# Patient Record
Sex: MALE | Race: WHITE | NOT HISPANIC OR LATINO | Employment: UNEMPLOYED | URBAN - METROPOLITAN AREA
[De-identification: names, ages, dates, MRNs, and addresses within clinical notes are randomized per-mention and may not be internally consistent; named-entity substitution may affect disease eponyms.]

---

## 2023-05-30 ENCOUNTER — HOSPITAL ENCOUNTER (EMERGENCY)
Facility: CLINIC | Age: 28
Discharge: HOME | End: 2023-05-30
Attending: EMERGENCY MEDICINE | Admitting: EMERGENCY MEDICINE

## 2023-05-30 VITALS
RESPIRATION RATE: 18 BRPM | SYSTOLIC BLOOD PRESSURE: 119 MMHG | WEIGHT: 140 LBS | BODY MASS INDEX: 21.22 KG/M2 | HEIGHT: 68 IN | HEART RATE: 54 BPM | TEMPERATURE: 98 F | OXYGEN SATURATION: 97 % | DIASTOLIC BLOOD PRESSURE: 78 MMHG

## 2023-05-30 DIAGNOSIS — S61.511A LACERATION OF RIGHT WRIST, INITIAL ENCOUNTER: ICD-10-CM

## 2023-05-30 PROCEDURE — 96372 THER/PROPH/DIAG INJ SC/IM: CPT | Performed by: EMERGENCY MEDICINE

## 2023-05-30 PROCEDURE — 12002 RPR S/N/AX/GEN/TRNK2.6-7.5CM: CPT

## 2023-05-30 PROCEDURE — 99284 EMERGENCY DEPT VISIT MOD MDM: CPT | Mod: 25 | Performed by: EMERGENCY MEDICINE

## 2023-05-30 PROCEDURE — 99284 EMERGENCY DEPT VISIT MOD MDM: CPT

## 2023-05-30 PROCEDURE — 12002 RPR S/N/AX/GEN/TRNK2.6-7.5CM: CPT | Performed by: EMERGENCY MEDICINE

## 2023-05-30 PROCEDURE — 250N000011 HC RX IP 250 OP 636: Performed by: EMERGENCY MEDICINE

## 2023-05-30 RX ORDER — ONDANSETRON 4 MG/1
4 TABLET, ORALLY DISINTEGRATING ORAL ONCE
Status: COMPLETED | OUTPATIENT
Start: 2023-05-30 | End: 2023-05-30

## 2023-05-30 RX ORDER — LORAZEPAM 2 MG/ML
1 INJECTION INTRAMUSCULAR ONCE
Status: COMPLETED | OUTPATIENT
Start: 2023-05-30 | End: 2023-05-30

## 2023-05-30 RX ADMIN — ONDANSETRON 4 MG: 4 TABLET, ORALLY DISINTEGRATING ORAL at 21:19

## 2023-05-30 RX ADMIN — HYDROMORPHONE HYDROCHLORIDE 1 MG: 1 INJECTION, SOLUTION INTRAMUSCULAR; INTRAVENOUS; SUBCUTANEOUS at 20:16

## 2023-05-30 RX ADMIN — LORAZEPAM 1 MG: 2 INJECTION INTRAMUSCULAR; INTRAVENOUS at 20:16

## 2023-05-30 ASSESSMENT — ACTIVITIES OF DAILY LIVING (ADL): ADLS_ACUITY_SCORE: 35

## 2023-05-30 NOTE — ED TRIAGE NOTES
Pt presents with laceration to right wrist. Pt injured it knocking on a door then knocking on the glass. This happened at least 6 hours ago. Waited at [facility] ER and where they applied pressure dressing.      Triage Assessment     Row Name 05/30/23 1861       Triage Assessment (Adult)    Airway WDL WDL       Respiratory WDL    Respiratory WDL WDL       Cardiac WDL    Cardiac WDL WDL

## 2023-05-31 NOTE — ED NOTES
Pt diaphoretic and reports feeling nauseous. MD to be updated. Pain unchanged after med administration

## 2023-05-31 NOTE — ED PROVIDER NOTES
History     Chief Complaint   Patient presents with     Arm Laceration     HPI  Kel Hale is a 27 year old male who presents to the er secondary to a cut on his right wrist which occurred about 5 hours pta when he was knocking on his door and no one was responding so he knocked harder until the glass broke and he sustained a laceration to the right volar wrist.  He went to [place] emergency department and they told him that it would require stitches and they wrapped it up.  They told him there was a 5-hour wait.  Because of that he decided to come down here.    Allergies:  Allergies   Allergen Reactions     No Known Allergies        Problem List:    Patient Active Problem List    Diagnosis Date Noted     Major depressive disorder, single episode, severe (H) 04/16/2015     Priority: Medium     Problem list name updated by automated process. Provider to review       Generalized anxiety disorder 04/16/2015     Priority: Medium     Diagnosis updated by automated process. Provider to review and confirm.       PTSD (post-traumatic stress disorder) 04/16/2015     Priority: Medium     ODD (oppositional defiant disorder) 04/16/2015     Priority: Medium     Insomnia 04/16/2015     Priority: Medium     ADHD (attention deficit hyperactivity disorder) 03/30/2010     Priority: Medium        Past Medical History:    Past Medical History:   Diagnosis Date     ADHD (attention deficit hyperactivity disorder)      Exertional headache, primary        Past Surgical History:    Past Surgical History:   Procedure Laterality Date     ENT SURGERY  6 months old    PE Tubes       Family History:    Family History   Problem Relation Age of Onset     Neurologic Disorder Mother         Mother with classic migraines.     Depression Mother      Anxiety Disorder Mother      Depression Brother        Social History:  Marital Status:  Single [1]  Social History     Tobacco Use     Smoking status: Every Day     Tobacco comments:     1/2 ppd  "  Substance Use Topics     Alcohol use: No     Drug use: No        Medications:    FLUOXETINE HCL PO  GUANFACINE HCL PO  TRAZODONE HCL PO          Review of Systems   All other systems reviewed and are negative.      Physical Exam   BP: (!) 130/97  Pulse: 104  Temp: 98  F (36.7  C)  Resp: 18  Height: 172.7 cm (5' 8\")  Weight: 63.5 kg (140 lb)  SpO2: 98 %      Physical Exam  Vitals and nursing note reviewed.   Constitutional:       General: He is in acute distress.      Appearance: Normal appearance. He is well-developed. He is not diaphoretic.      Comments: Crying, sweating, hyperventilating, shaking, anxious holding his right arm which is wrapped covering his wound up.   HENT:      Head: Normocephalic and atraumatic.      Right Ear: Tympanic membrane and external ear normal.      Left Ear: Tympanic membrane and external ear normal.      Nose: Nose normal.      Mouth/Throat:      Mouth: Mucous membranes are moist.   Eyes:      General: No scleral icterus.     Extraocular Movements: Extraocular movements intact.      Conjunctiva/sclera: Conjunctivae normal.   Cardiovascular:      Rate and Rhythm: Normal rate and regular rhythm.   Pulmonary:      Comments: Hyperventilating  Musculoskeletal:         General: Normal range of motion.      Cervical back: Normal range of motion and neck supple.   Skin:     General: Skin is warm and dry.      Findings: No rash.      Comments: 3 cm somewhat jagged full-thickness laceration over the volar aspect of the right wrist and just proximal to that there is 1/2 cm full-thickness jagged laceration.  No foreign bodies in the wound.   Neurological:      General: No focal deficit present.      Mental Status: He is alert and oriented to person, place, and time.   Psychiatric:         Mood and Affect: Mood normal.         ED Course     Patient was hyperventilating and tremulous and would not allow me to look at the wound or take the dressing off without any medication.  Because of that he " was given 1 mg of Dilaudid and 1 mg of Ativan IM with some improvement.  Procedure was able to be performed after that.  He was also given Zofran ODT.       Procedures         [facility] Procedure Note        Laceration Repair:    Performed by: Tavo Lucio MD  Authorized by: Tavo Lucio MD  Consent given by: Patient and Family who states understanding of the procedure being performed after discussing the risks, benefits and alternatives.    Preparation: Patient was prepped and draped in usual sterile fashion.  Irrigation solution: saline    Body area:right volar wrist  Laceration length: 3 cm and 0.5 cm   Contamination: The wound is not contaminated.  Foreign bodies:none  Tendon involvement: none  Anesthesia: Local  Local anesthetic: Lidocaine     1%  Anesthetic total: 5ml    Debridement: none  Skin closure: Closed with 6  And 2 x 4.0 Ethilon  Technique: interrupted  Approximation: close  Approximation difficulty: simple    Patient tolerance: Patient tolerated the procedure well with no immediate complications.    No results found for this or any previous visit (from the past 24 hour(s)).    Medications   LORazepam (ATIVAN) injection 1 mg (1 mg Intramuscular $Given 5/30/23 2016)   HYDROmorphone (DILAUDID) injection 1 mg (1 mg Intramuscular $Given 5/30/23 2016)   ondansetron (ZOFRAN ODT) ODT tab 4 mg (4 mg Oral $Given 5/30/23 2119)       Assessments & Plan (with Medical Decision Making)  Laceration of the right volar wrist.  Repaired as above.  Patient was highly anxious but in the and was cooperative.  Wound was cleansed before laceration repair.  Antibiotic ointment nonstick bandage was placed prior to discharge.  Patient was advised to follow-up within the week for recheck of the wound.  This was difficult to evaluate since the patient was very anxious and then sedated.  I do not see any evidence for tendon or nerve laceration.  He was able to move all 5 fingers in his hand.  Wound care and suture  removal timeframe was discussed with the patient and his mother who agree with the plan.  All questions answered prior to discharge.     I have reviewed the nursing notes.    I have reviewed the findings, diagnosis, plan and need for follow up with the patient.          Medical Decision Making  The patient's presentation was of low complexity (an acute and uncomplicated illness or injury).    The patient's evaluation involved:  history and exam without other MDM data elements    The patient's management necessitated moderate risk (prescription drug management including medications given in the ED).        New Prescriptions    No medications on file       Final diagnoses:   Laceration of right wrist, initial encounter       5/30/2023   [facility] EMERGENCY DEPT     Tavo Lucio MD  05/30/23 [id number]

## 2023-05-31 NOTE — DISCHARGE INSTRUCTIONS
Keep wound clean and dry.  Apply antibiotic ointment and fresh bandage every other day. Follow up in the clinic in 7 days  for suture removal and wound check.